# Patient Record
Sex: FEMALE | Race: WHITE
[De-identification: names, ages, dates, MRNs, and addresses within clinical notes are randomized per-mention and may not be internally consistent; named-entity substitution may affect disease eponyms.]

---

## 2019-04-01 ENCOUNTER — HOSPITAL ENCOUNTER (OUTPATIENT)
Dept: HOSPITAL 80 - FSGY | Age: 59
Discharge: NURSING, REHAB OR LONG-TERM CARE | End: 2019-04-01
Payer: MEDICAID

## 2019-04-01 VITALS — SYSTOLIC BLOOD PRESSURE: 88 MMHG | DIASTOLIC BLOOD PRESSURE: 69 MMHG

## 2019-04-01 DIAGNOSIS — Z79.899: ICD-10-CM

## 2019-04-01 DIAGNOSIS — Z95.2: ICD-10-CM

## 2019-04-01 DIAGNOSIS — K44.9: ICD-10-CM

## 2019-04-01 DIAGNOSIS — G47.33: ICD-10-CM

## 2019-04-01 DIAGNOSIS — M11.271: ICD-10-CM

## 2019-04-01 DIAGNOSIS — K92.1: ICD-10-CM

## 2019-04-01 DIAGNOSIS — Q21.0: ICD-10-CM

## 2019-04-01 DIAGNOSIS — R63.4: ICD-10-CM

## 2019-04-01 DIAGNOSIS — R13.10: Primary | ICD-10-CM

## 2019-04-01 DIAGNOSIS — K62.89: ICD-10-CM

## 2019-04-01 DIAGNOSIS — Q90.9: ICD-10-CM

## 2019-04-01 DIAGNOSIS — F42.9: ICD-10-CM

## 2019-04-01 DIAGNOSIS — Z79.82: ICD-10-CM

## 2019-04-01 DIAGNOSIS — I50.30: ICD-10-CM

## 2019-04-01 DIAGNOSIS — K21.9: ICD-10-CM

## 2019-04-01 DIAGNOSIS — I27.20: ICD-10-CM

## 2019-04-01 DIAGNOSIS — M81.0: ICD-10-CM

## 2019-04-01 DIAGNOSIS — K64.9: ICD-10-CM

## 2019-04-01 DIAGNOSIS — H26.9: ICD-10-CM

## 2019-04-01 PROCEDURE — 43239 EGD BIOPSY SINGLE/MULTIPLE: CPT

## 2019-04-01 PROCEDURE — C1726 CATH, BAL DIL, NON-VASCULAR: HCPCS

## 2019-04-01 PROCEDURE — 45380 COLONOSCOPY AND BIOPSY: CPT

## 2019-04-01 PROCEDURE — 43249 ESOPH EGD DILATION <30 MM: CPT

## 2019-04-01 NOTE — PDGENHP
History & Physical


Chief Complaint: dysphagia, weight loss


History of Present Illness: 58 year old female presents for evaluation of 

dysphagia, hematochezia, and weight loss. Also complaining of pain during bowel 

movement.


Pertinent Past, Social, Family History: PMHx: Downs syndrome, LEIGHTON, VSD.  FaMHx; 

sis - skin cancer.  SoHx: No alcohol or cigs


Relevant Physical Exam: HEENT: anicteric.  CV: RRR +s1s2 + murmur.  Lungs: 

CTAB.  Abd; soft, nt, + bs


Cardiorespiratory Assessment: ASA 3

## 2019-04-01 NOTE — GIREPORT
[facility]

Surgical Services - Endoscopy Department

_____________________________________________________________________________________________________
_______

Patient Name: Lashon Broderick                             Procedure Date: 4/1/2019 12:14 PM

MRN: [id number]                                       Account Number: [id number]

Patient Type: Outpatient                             Attending  MD/ ER Physician: Teo Rodriguez MD

_____________________________________________________________________________________________________
_______

 

Procedure:                    Colonoscopy

Indications:                  Hematochezia, Rectal pain

Patient Profile:              58 year old female presents for evaluation of hematochezia/anal pain/ch
omar 

                              in bowel habits. She had a prior attempt at a colonoscopy in 2011 but t
he 

                              procedure was aborted due to poor prep.

Providers:                    Teo Rodriguez MD

Medicines:                    Monitored Anesthesia Care

Complications:                No immediate complications. Estimated blood loss: Minimal.

Description of Procedure:     After obtaining informed consent, the scope was passed under direct vis
ion. 

                              Throughout the procedure, the patient's blood pressure, pulse, and oxyg
en 

                              saturations were monitored continuously.The colonoscopy was performed 

                              without difficulty. The patient tolerated the procedure well. The quali
ty of 

                              the bowel preparation was good. The Colonoscope with irrigation channel
 was 

                              introduced through the anus and advanced to the terminal ileum. The ter
emi 

                              ileum, ileocecal valve, appendiceal orifice, and rectum were photograph
ed.

Findings:                     Hemorrhoids were found on perianal exam. A healed anal fissure was also
 seen.

                              The colon (entire examined portion) appeared normal. Biopsies for histo
logy 

                              were taken with a cold forceps for evaluation of microscopic colitis.

                              The terminal ileum appeared normal.

Estimated Blood Loss:         Estimated blood loss was minimal.

Post Op Diagnosis:            - Hemorrhoids found on perianal exam. Healed anal fissure noted.

                              - The entire examined colon is normal. Biopsied.

                              - The examined portion of the ileum was normal.

                              - Etiology? Suspect blood is from anal fissure/hemorrhoids. Anal fissur
e 

                              appears healed. If recurrent symptoms recommend 2% dilitiazem ointment 
BID 

                              to anal area as well as Lidocaine 4% jelly BID to anal area.

Recommendation:               - Advance diet as tolerated.

                              - Continue present medications.

                              - Await pathology results.

                              - If anal pain continues, would treat her empirically for an anal fissu
re.

                              - Repeat colonoscopy in 10 years for screening purposes.

                              - Thank you for allowing me to participate in the care of your patient.


Attending Participation:

     I personally performed the entire procedure.

 

Teo Rodriguez MD

_____________

Teo Rodriguez MD

4/1/2019 1:09:46 PM

This report has been signed electronicallyTeo Rodriguez MD

Number of Addenda: 0

 

Note Initiated On: 4/1/2019 12:14 PM

Total Procedure Duration Time 0 hours 17 minutes 47 seconds 

[email or web address]

## 2019-04-01 NOTE — GIREPORT
[facility]

Surgical Services - Endoscopy Department

_____________________________________________________________________________________________________
_______

Patient Name: Lashon Broderick                             Procedure Date: 4/1/2019 12:10 PM

MRN: [id number]                                       Account Number: [id number]

Patient Type: Outpatient                             Attending  MD/ ER Physician: Teo Rodriguez MD

_____________________________________________________________________________________________________
_______

 

Procedure:                    Upper GI endoscopy

Indications:                  Dysphagia, Weight loss

Patient Profile:              58 year old female with a history of LEIGHTON, Downs syndrome, VSD presents 
for 

                              evaluation of dysphagia/heartburn/epigastric abdominal pain.

Providers:                    Teo Rodriguez MD

Medicines:                    Monitored Anesthesia Care

Complications:                No immediate complications. Estimated blood loss: Minimal.

Description of Procedure:     After obtaining informed consent, the endoscope was passed under direct
 

                              vision. Throughout the procedure, the patient's blood pressure, pulse, 
and 

                              oxygen saturations were monitored continuously.The upper GI endoscopy w
as 

                              accomplished without difficulty. The patient tolerated the procedure we
ll. 

                              The Endoscope was introduced through the mouth, and advanced to the sec
ond 

                              part of duodenum.

Findings:                     The examined esophagus was normal.

                              Biopsies were taken with a cold forceps in the middle third of the esop
hagus 

                              for histology. A TTS dilator was passed through the scope. Dilation wit
h a 

                              15-16.5-18 mm balloon dilator was performed to 18 mm in the entire esop
hagus.

                              A small hiatal hernia was present.

                              Patchy mildly erythematous mucosa was found in the gastric body and in 
the 

                              gastric antrum. Biopsies were taken with a cold forceps for histology.

                              The examined duodenum was normal. Biopsies for histology were taken wit
h a 

                              cold forceps for evaluation of celiac disease.

Estimated Blood Loss:         Estimated blood loss was minimal.

Post Op Diagnosis:            - Normal esophagus.

                              - Small hiatal hernia.

                              - Erythematous mucosa in the gastric body and antrum. Biopsied.

                              - Normal examined duodenum. Biopsied.

                              - Biopsies were taken with a cold forceps for histology in the middle t
hird 

                              of the esophagus.

                              - Empiric Dilation performed in the entire esophagus.

                              - Etiology? No obvious cause of symptoms seen. No stricture or mass not
ed. 

                              Secondary to reflux? Await biopsy results. Can try trial of full dose P
PI 

                              BID for 6 weeks.

Recommendation:               - Perform a colonoscopy today.

                              - Use a proton pump inhibitor PO BID.

                              - Follow an antireflux regimen.

                              - Thank you for allowing me to participate in the care of your patient.


Attending Participation:

     I personally performed the entire procedure.

 

Teo Rodriguez MD

_____________

Teo Rodriguez MD

4/1/2019 1:02:46 PM

This report has been signed electronicallyTeo Rodriguez MD

Number of Addenda: 0

 

Note Initiated On: 4/1/2019 12:10 PM

[email or web address]

## 2021-01-11 NOTE — PDANEPAE
ANE History of Present Illness





58 year old female for EGD and colonoscopy for abdominal pain.





ANE Past Medical History





- Cardiovascular History


Hx Hypertension: No


Hx Arrhythmias: No


Hx Chest Pain: No


Hx Coronary Artery / Peripheral Vascular Disease: No


Hx CHF / Valvular Disease: Yes


Hx Palpitations: No


Cardiovascular History Comment: CHF 4 YRS AGO.  PULM HTN





- Pulmonary History


Hx COPD: No


Hx Asthma/Reactive Airway Disease: No


Hx Recent Upper Respiratory Infection: No


Hx Oxygen in Use at Home: No


Hx Sleep Apnea: Yes


Pulmonary History Comment: BRONCHITIS 12/2018.  URI 1/2019.  DX LEIGHTON USES C-PAP





- Neurologic History


Hx Cerebrovascular Accident: No


Hx Seizures: No


Hx Dementia: Yes


Neurologic History Comment: BEGINNING





- Endocrine History


Hx Diabetes: No





- Renal History


Hx Renal Disorders: Yes


Renal History Comment: HX OF NODULE.  URGE INCONT





- Liver History


Hx Hepatic Disorders: No





- Neurological & Psychiatric Hx


Hx Neurological and Psychiatric Disorders: No





- Cancer History


Hx Cancer: No





- Congenital Disorder History


Hx Congenital Disorders: Yes


Congenital History Comment: DOWN'S SYNDROME





- GI History


Hx Gastrointestinal Disorders: Yes


Gastrointestinal History Comment: DYSPLASIA.  GERD.  WEIGHT LOSS.  CHRONIC 

CONSTIPATION





- Other Health History


Other Health History: ARTHRITIS.  FULL DENTURES





- Chronic Pain History


Chronic Pain: Yes (ARTHRITIS,JOINT FATIQUE)





- Surgical History


Prior Surgeries: TUBAL LIGATION.  CARPAL TUNNEL.  ATRIAL SEPT DEFECT.  THYROID 

NODULE





ANE Review of Systems


Review of systems is: negative


Review of Systems: 








- Exercise capacity


METS (RN): 3 METS





ANE Patient History





- Allergies


Allergies/Adverse Reactions: 








No Known Allergies Allergy (Verified 08/31/15 00:30)


 








- Home Medications


Home Medications: 








Calcium Carbonate [Tums 500MG (OTC)] 1,000 mg PO DAILY 01/10/14 [Last Taken 1 

Week Ago ~03/25/19]


Cholecalciferol (Vitamin D3) [Vitamin D3] 1,200 unit PO DAILY 01/10/14 [Last 

Taken 1 Day Ago ~03/31/19]


Docusate Sodium [Colace] 100 mg PO HS 01/10/14 [Last Taken 1 Day Ago ~03/31/19]


Fluticasone Nasal [Flonase Nasal Spray (RX)] 1 sprays NASAL DAILY 01/10/14 [

Last Taken 1 Day Ago ~03/31/19]


Herbals/Supplements -Info Only 1 each PO AD 01/10/14 [Last Taken 1 Week Ago ~03/ 25/19]


Lansoprazole [Prevacid 24hr] 30 mg PO BID 01/10/14 [Last Taken 1 Day Ago ~03/31/ 19]


Levothyroxine Sodium 75 mcg PO DAILY 01/10/14 [Last Taken 1 Day Ago ~03/31/19]


Multivitamins [Tab-A-Yusef] 1 each PO DAILY 01/10/14 [Last Taken 1 Week Ago ~03/ 25/19]


Omega-3 Fatty Acids/Fish Oil [Omega 3 1,000 mg Softgel] 1 each PO HS 01/10/14 [

Last Taken 1 Week Ago ~03/25/19]


Polyethylene Glycol 3350 [Miralax 17 gm (OTC)] 17 gm PO BID 01/10/14 [Last 

Taken 1 Day Ago ~03/31/19]


Sertraline HCl [Zoloft 100mg (RX)] 100 mg PO DAILY 01/10/14 [Last Taken 1 Day 

Ago ~03/31/19]


Sertraline HCl [Zoloft] 25 mg PO HS 01/10/14 [Last Taken 1 Day Ago ~03/31/19]


Vitamin B Complex [Vitamin B Complex (OTC)] 1 each PO DAILY 01/10/14 [Last 

Taken 1 Week Ago ~03/25/19]


risperiDONE [Risperdal 0.5mg (RX)] 0.5 mg PO HS 01/10/14 [Last Taken 1 Day Ago ~

03/31/19]


Amiloride [Amiloride 5 MG (RX)] 10 mg PO DAILY 08/31/15 [Last Taken 1 Week Ago ~

03/25/19]


L. Acidophilus/Pectin, Citrus [Acidophilus-Pectin Captab] 1 each PO DAILY 08/31/

15 [Last Taken 1 Day Ago ~03/31/19]


ACETAMINOPHEN BID 03/21/19 [Last Taken 1 Day Ago ~03/31/19]


ACETAZOLAMIDE DAILY 03/21/19 [Last Taken 1 Day Ago ~03/31/19]


Cymbalta HS 03/21/19 [Last Taken 1 Day Ago ~03/31/19]


Naproxen DAILY 03/21/19 [Last Taken 1 Day Ago ~03/31/19]


Ranitidine HCl BID 03/21/19 [Last Taken 1 Day Ago ~03/31/19]








- NPO status


NPO Since - Liquids (Date): 04/01/19


NPO Since - Liquids (Time): 03:00


NPO Since - Solids (Date): 03/31/19


NPO Since - Solids (Time): 12:00





- Smoking Hx


Smoking Status: Former smoker





ANE Labs/Vital Signs





- Vital Signs


Blood Pressure: 86/55


Heart Rate: 80


Respiratory Rate: 22


O2 Sat (%): 90


Height: 142.24 cm


Weight: 41.73 kg





ANE Physical Exam





- Airway


Neck exam: FROM


Mallampati Score: Class 2


Mouth exam: dentures





- Pulmonary


Pulmonary: no respiratory distress





- Cardiovascular


Cardiovascular: regular rate and rhythym





- ASA Status


ASA Status: II





ANE Anesthesia Plan


Anesthesia Plan: MAC Radiation Therapy Patient Education    Person involved with teaching: Patient    Patient educational needs for self management of treatment-related side effects assessment completed.  [facility] Patient Ed tab contains Patient Learning Assessment    Education Materials Given  Radiation Therapy and You    Educational Topics Discussed  Side effects expected, Pain management, Skin care and When to call MD/RN    Response To Teaching  Verbalizes understanding    GYN Only  Vaginal Dilator-given and educated: N/A    Referrals sent: None    Chemotherapy?  No